# Patient Record
Sex: MALE | Race: WHITE | NOT HISPANIC OR LATINO | URBAN - METROPOLITAN AREA
[De-identification: names, ages, dates, MRNs, and addresses within clinical notes are randomized per-mention and may not be internally consistent; named-entity substitution may affect disease eponyms.]

---

## 2017-07-28 RX ORDER — ERYTHROMYCIN STEARATE 250 MG
250 TABLET ORAL 3 TIMES DAILY
COMMUNITY

## 2017-07-28 RX ORDER — HYDROCHLOROTHIAZIDE 25 MG/1
25 TABLET ORAL DAILY
COMMUNITY

## 2017-07-28 RX ORDER — TAMSULOSIN HYDROCHLORIDE 0.4 MG/1
1 CAPSULE ORAL NIGHTLY
COMMUNITY

## 2017-07-28 RX ORDER — OMEPRAZOLE 20 MG/1
20 CAPSULE, DELAYED RELEASE ORAL DAILY
COMMUNITY

## 2017-07-28 RX ORDER — MELOXICAM 15 MG/1
15 TABLET ORAL DAILY
COMMUNITY
End: 2019-08-06

## 2017-07-28 RX ORDER — CETIRIZINE HYDROCHLORIDE 10 MG/1
10 TABLET ORAL DAILY
COMMUNITY

## 2017-08-01 ENCOUNTER — OFFICE VISIT (OUTPATIENT)
Dept: ORTHOPEDIC SURGERY | Facility: CLINIC | Age: 67
End: 2017-08-01

## 2017-08-01 VITALS
WEIGHT: 238 LBS | HEIGHT: 74 IN | SYSTOLIC BLOOD PRESSURE: 137 MMHG | HEART RATE: 62 BPM | DIASTOLIC BLOOD PRESSURE: 92 MMHG | BODY MASS INDEX: 30.54 KG/M2

## 2017-08-01 DIAGNOSIS — Z96.653 STATUS POST TOTAL KNEE REPLACEMENT USING CEMENT, BILATERAL: Primary | ICD-10-CM

## 2017-08-01 DIAGNOSIS — Z09 S/P ORTHOPEDIC SURGERY, FOLLOW-UP EXAM: ICD-10-CM

## 2017-08-01 PROCEDURE — 99213 OFFICE O/P EST LOW 20 MIN: CPT | Performed by: PHYSICIAN ASSISTANT

## 2017-08-01 RX ORDER — ATORVASTATIN CALCIUM 20 MG/1
20 TABLET, FILM COATED ORAL DAILY
COMMUNITY

## 2017-08-01 RX ORDER — FINASTERIDE 5 MG/1
5 TABLET, FILM COATED ORAL DAILY
COMMUNITY

## 2017-08-01 NOTE — PROGRESS NOTES
Subjective     Follow-up of the Right Knee (Rt TKA 6-10-14) and Follow-up of the Left Knee (Left TKA 3-11-14)      Sumanth Zurita is a 66 y.o. male.     History of Present Illness   Patient comes in for routine follow-up bilateral total knee arthroplasty with Dr. lynn Little.  He reports no pain or problems from the knees.  He continues to have some mild stiffness in the mornings that resolves once he gets up and moving.  He has been very pleased with the outcome of his replacements.  [place] and is able to do any activity he would like  No Known Allergies  Current Outpatient Prescriptions on File Prior to Visit   Medication Sig Dispense Refill   • cetirizine (zyrTEC) 10 MG tablet Take 10 mg by mouth Daily.     • erythromycin (ERYTHROCIN STEARATE) 250 MG tablet Take 250 mg by mouth 3 (Three) Times a Day.     • hydrochlorothiazide (HYDRODIURIL) 25 MG tablet Take 25 mg by mouth Daily.     • meloxicam (MOBIC) 15 MG tablet Take 15 mg by mouth Daily.     • omeprazole (priLOSEC) 20 MG capsule Take 20 mg by mouth Daily.     • tamsulosin (FLOMAX) 0.4 MG capsule 24 hr capsule Take 1 capsule by mouth Every Night.       No current facility-administered medications on file prior to visit.      Social History     Social History   • Marital status:      Spouse name: N/A   • Number of children: N/A   • Years of education: N/A     Occupational History   • Not on file.     Social History Main Topics   • Smoking status: Former Smoker     Packs/day: 0.50     Years: 1.00     Types: Cigars   • Smokeless tobacco: Never Used   • Alcohol use No   • Drug use: No   • Sexual activity: Defer     Other Topics Concern   • Not on file     Social History Narrative     Past Surgical History:   Procedure Laterality Date   • JOINT REPLACEMENT Bilateral    • TOTAL KNEE ARTHROPLASTY Bilateral      Family History   Problem Relation Age of Onset   • Heart disease Mother    • Hypertension Mother    • Arthritis Mother   "    Past Medical History:   Diagnosis Date   • Kidney stone    • Sleep apnea          Review of Systems   Constitutional: Negative.    HENT: Negative.    Eyes: Negative.    Respiratory: Positive for apnea.    Cardiovascular: Negative.    Gastrointestinal: Negative.    Endocrine: Negative.    Genitourinary: Negative.    Musculoskeletal: Positive for back pain, neck pain and neck stiffness.   Skin: Negative.    Allergic/Immunologic: Negative.    Neurological: Negative.    Hematological: Negative.    Psychiatric/Behavioral: Negative.        The following portions of the patient's history were reviewed and updated as appropriate: allergies, current medications, past family history, past medical history, past social history, past surgical history and problem list.    Objective   /92  Pulse 62  Ht 73.5\" (186.7 cm)  Wt 238 lb (108 kg)  BMI 30.97 kg/m2    Physical Exam:   GENERAL: Body habitus: obese    Lower extremity edema: Left: none; Right: none    Varicose veins:  Left: none; Right: none    Gait: normal     Mental Status:  awake and alert; oriented to person, place, and time    Voice:  clear  SKIN:  Normal    Hair Growth:  Right:normal; Left:  normal  HEENT: Head: Normocephalic, no lesions, without obvious abnormality.     Eyes: sclera anicteric  PULM:  Repiratory effort normal    Ortho Exam    Right Knee Exam  ----------  ALIGNMENT: Right: neutral----------  RANGE OF MOTION:  Right: 0-120  LIGAMENTOUS STABILITY:   Right:stable to varus and valgus stress at 0 and 30 degrees without any evidence of laxity----------  STRENGTH:  KNEE FLEXION Right 5/5  KNEE EXTENSION Right 5/5 ----------  PAIN WITH PALPATION: Right denies tenderness to palpation about the knee  PAIN WITH KNEE ROM: Right no  PATELLAR CREPITUS: Right no   ----------    Left Knee Exam  ----------  Knee Exam:  ----------  ALIGNMENT:  Left: neutral  ----------  RANGE OF MOTION:  Left: 0-120  LIGAMENTOUS STABILITY:   Left:stable to varus and valgus " stress at 0 and 30 degrees without any evidence of laxity   ----------  STRENGTH:  KNEE FLEXION  Left 5/5  KNEE EXTENSION Left 5/5  ----------  PAIN WITH PALPATION: Left denies tenderness to palpation about the knee  PAIN WITH KNEE ROM:  Left no  PATELLAR CREPITUS:  Left no  ----------        Medical Decision Making    Data Review:   ordered and reviewed x-rays today    Assessment and Plan/ Diagnosis/Treatment options:   Doing well status post bilateral total knee arthroplasty.  Patient reports no pain or problems from the knees.  On exam, he has good range of motion with stable ligamentous exam.  X-rays today show no evidence of osteolysis, subsidence or fracture.  Patient reports she is able to do any activity he would like without difficulty.  Plan is continued observation.  I will see him back in 2 years with repeat x-rays of bilateral knees or sooner if needed.

## 2019-01-02 ENCOUNTER — AMBULATORY SURGICAL CENTER (OUTPATIENT)
Dept: URBAN - METROPOLITAN AREA SURGERY 10 | Facility: SURGERY | Age: 69
End: 2019-01-02

## 2019-01-02 ENCOUNTER — OFFICE (OUTPATIENT)
Dept: URBAN - METROPOLITAN AREA PATHOLOGY 4 | Facility: PATHOLOGY | Age: 69
End: 2019-01-02

## 2019-01-02 DIAGNOSIS — K63.5 POLYP OF COLON: ICD-10-CM

## 2019-01-02 DIAGNOSIS — D12.5 BENIGN NEOPLASM OF SIGMOID COLON: ICD-10-CM

## 2019-01-02 DIAGNOSIS — D12.2 BENIGN NEOPLASM OF ASCENDING COLON: ICD-10-CM

## 2019-01-02 DIAGNOSIS — Z86.010 PERSONAL HISTORY OF COLONIC POLYPS: ICD-10-CM

## 2019-01-02 DIAGNOSIS — K64.0 FIRST DEGREE HEMORRHOIDS: ICD-10-CM

## 2019-01-02 DIAGNOSIS — K51.40 INFLAMMATORY POLYPS OF COLON WITHOUT COMPLICATIONS: ICD-10-CM

## 2019-01-02 PROCEDURE — 45385 COLONOSCOPY W/LESION REMOVAL: CPT | Mod: PT | Performed by: INTERNAL MEDICINE

## 2019-01-02 PROCEDURE — 88305 TISSUE EXAM BY PATHOLOGIST: CPT | Performed by: INTERNAL MEDICINE

## 2019-08-06 ENCOUNTER — OFFICE VISIT (OUTPATIENT)
Dept: ORTHOPEDIC SURGERY | Facility: CLINIC | Age: 69
End: 2019-08-06

## 2019-08-06 VITALS — OXYGEN SATURATION: 98 % | WEIGHT: 238.1 LBS | HEIGHT: 74 IN | HEART RATE: 78 BPM | BODY MASS INDEX: 30.56 KG/M2

## 2019-08-06 DIAGNOSIS — Z96.653 STATUS POST TOTAL BILATERAL KNEE REPLACEMENT: Primary | ICD-10-CM

## 2019-08-06 PROCEDURE — 99212 OFFICE O/P EST SF 10 MIN: CPT | Performed by: PHYSICIAN ASSISTANT

## 2019-08-06 RX ORDER — ALLOPURINOL 300 MG/1
TABLET ORAL
COMMUNITY
Start: 2019-05-06

## 2019-08-06 RX ORDER — GABAPENTIN 300 MG/1
CAPSULE ORAL
COMMUNITY
Start: 2019-08-05

## 2019-08-06 NOTE — PROGRESS NOTES
[facility] – [place], [place] Orthopaedic Surgery Clinic Note    Subjective     Patient: Sumanth Zurita  : 1950    Primary Care Provider: Aure Zurita PA-C    Requesting Provider: As above    Follow-up of the Right Knee (Right Total Knee Arthroplasty 06/10/2014 by MAN) and Follow-up of the Left Knee (Left Total Knee Arthroplasty 2014 by MAN)      History    Chief Complaint: Follow-up bilateral total knee arthroplasty    History of Present Illness: Patient presents today for routine annual follow-up bilateral to right total knee arthroplasty with Dr. Strange in .  He reports no pain or problems with his knees.  Is been very happy with his outcome.    Current Outpatient Medications on File Prior to Visit   Medication Sig Dispense Refill   • allopurinol (ZYLOPRIM) 300 MG tablet      • atorvastatin (LIPITOR) 20 MG tablet Take 20 mg by mouth Daily.     • cetirizine (zyrTEC) 10 MG tablet Take 10 mg by mouth Daily.     • erythromycin (ERYTHROCIN STEARATE) 250 MG tablet Take 250 mg by mouth 3 (Three) Times a Day.     • finasteride (PROSCAR) 5 MG tablet Take 5 mg by mouth Daily.     • gabapentin (NEURONTIN) 300 MG capsule      • hydrochlorothiazide (HYDRODIURIL) 25 MG tablet Take 25 mg by mouth Daily.     • omeprazole (priLOSEC) 20 MG capsule Take 20 mg by mouth Daily.     • tamsulosin (FLOMAX) 0.4 MG capsule 24 hr capsule Take 1 capsule by mouth Every Night.     • [DISCONTINUED] meloxicam (MOBIC) 15 MG tablet Take 15 mg by mouth Daily.       No current facility-administered medications on file prior to visit.       No Known Allergies   Past Medical History:   Diagnosis Date   • Kidney stone    • Sleep apnea      Past Surgical History:   Procedure Laterality Date   • JOINT REPLACEMENT Bilateral    • TOTAL KNEE ARTHROPLASTY Bilateral      Family History   Problem Relation Age of Onset   • Heart disease Mother    • Hypertension Mother    • Arthritis Mother       Social History     Socioeconomic History   • Marital status:  "     Spouse name: Not on file   • Number of children: Not on file   • Years of education: Not on file   • Highest education level: Not on file   Tobacco Use   • Smoking status: Former Smoker     Packs/day: 0.50     Years: 1.00     Pack years: 0.50     Types: Cigars   • Smokeless tobacco: Never Used   Substance and Sexual Activity   • Alcohol use: No   • Drug use: No   • Sexual activity: Defer        Review of Systems   Constitutional: Negative.    HENT: Negative.    Eyes: Negative.    Respiratory: Positive for apnea.    Cardiovascular: Negative.    Gastrointestinal: Negative.    Endocrine: Negative.    Genitourinary: Negative.    Musculoskeletal: Positive for joint swelling.   Skin: Negative.    Allergic/Immunologic: Negative.    Neurological: Negative.    Hematological: Negative.    Psychiatric/Behavioral: Negative.        The following portions of the patient's history were reviewed and updated as appropriate: allergies, current medications, past family history, past medical history, past social history, past surgical history and problem list.      Objective      Physical Exam  Pulse 78   Ht 186.7 cm (73.5\")   Wt 108 kg (238 lb 1.6 oz)   SpO2 98%   BMI 30.98 kg/m²     Body mass index is 30.98 kg/m².    Patient is well developed, well nourished and in no acute distress.  Alert and oriented x 3.    Ortho Exam    Right Knee Exam  ----------  ALIGNMENT: Right: neutral----------  RANGE OF MOTION:  Right: Normal (0-120 degrees) with no extensor lag or flexion contracture  LIGAMENTOUS STABILITY:   Right:stable to varus and valgus stress at terminal extension and 30 degrees without any evidence of laxity----------  STRENGTH:  KNEE FLEXION Right 5/5  KNEE EXTENSION Right 5/5 ----------  PAIN WITH PALPATION: Right denies tenderness to palpation about the knee  PAIN WITH KNEE ROM: Right no  PATELLAR CREPITUS: Right no   ----------    Left Knee Exam  ----------  Knee Exam:  ----------  ALIGNMENT:  Left: " neutral  ----------  RANGE OF MOTION:  Left: Normal (0-120 degrees) with no extensor lag or flexion contracture  LIGAMENTOUS STABILITY:   Left:stable to varus and valgus stress at terminal extension and 30 degrees without any evidence of laxity   ----------  STRENGTH:  KNEE FLEXION  Left 5/5  KNEE EXTENSION Left 5/5  ----------  PAIN WITH PALPATION: Left denies tenderness to palpation about the knee  PAIN WITH KNEE ROM:  Left no  PATELLAR CREPITUS:  Left no  ----------    Well-healed anterior knee incisions    Medical Decision Making    Data Review:   ordered and reviewed x-rays today    Assessment:  1. Status post total bilateral knee replacement        Plan:  Doing well status post bilateral total knee arthroplasty with Dr. Bob Strange in 2014.  X-rays today show well-positioned, cemented total knee arthroplasty with no evidence of osteolysis, subsidence of fracture.  Patient is having no pain or problems with the knees.  He will return to see us as needed.        Xi Roberts PA-C  08/06/19  9:55 AM

## 2024-01-18 ENCOUNTER — OFFICE (OUTPATIENT)
Dept: URBAN - METROPOLITAN AREA PATHOLOGY 4 | Facility: PATHOLOGY | Age: 74
End: 2024-01-18

## 2024-01-18 ENCOUNTER — AMBULATORY SURGICAL CENTER (OUTPATIENT)
Dept: URBAN - METROPOLITAN AREA SURGERY 10 | Facility: SURGERY | Age: 74
End: 2024-01-18
Payer: MEDICARE

## 2024-01-18 DIAGNOSIS — D12.4 BENIGN NEOPLASM OF DESCENDING COLON: ICD-10-CM

## 2024-01-18 DIAGNOSIS — D12.3 BENIGN NEOPLASM OF TRANSVERSE COLON: ICD-10-CM

## 2024-01-18 DIAGNOSIS — K62.1 RECTAL POLYP: ICD-10-CM

## 2024-01-18 DIAGNOSIS — Z86.010 PERSONAL HISTORY OF COLONIC POLYPS: ICD-10-CM

## 2024-01-18 DIAGNOSIS — K63.5 POLYP OF COLON: ICD-10-CM

## 2024-01-18 DIAGNOSIS — Z09 ENCOUNTER FOR FOLLOW-UP EXAMINATION AFTER COMPLETED TREATMEN: ICD-10-CM

## 2024-01-18 DIAGNOSIS — K64.1 SECOND DEGREE HEMORRHOIDS: ICD-10-CM

## 2024-01-18 PROCEDURE — 88305 TISSUE EXAM BY PATHOLOGIST: CPT | Performed by: INTERNAL MEDICINE

## 2024-01-18 PROCEDURE — 45385 COLONOSCOPY W/LESION REMOVAL: CPT | Mod: PT | Performed by: INTERNAL MEDICINE

## 2024-01-22 PROBLEM — Z86.010 PERSONAL HISTORY OF COLON POLYPS: Status: ACTIVE | Noted: 2024-01-22
